# Patient Record
Sex: FEMALE | Race: WHITE | NOT HISPANIC OR LATINO | Employment: FULL TIME | URBAN - METROPOLITAN AREA
[De-identification: names, ages, dates, MRNs, and addresses within clinical notes are randomized per-mention and may not be internally consistent; named-entity substitution may affect disease eponyms.]

---

## 2023-05-20 ENCOUNTER — HOSPITAL ENCOUNTER (EMERGENCY)
Facility: CLINIC | Age: 53
Discharge: HOME | End: 2023-05-20
Attending: EMERGENCY MEDICINE | Admitting: EMERGENCY MEDICINE
Payer: COMMERCIAL

## 2023-05-20 ENCOUNTER — APPOINTMENT (OUTPATIENT)
Dept: GENERAL RADIOLOGY | Facility: CLINIC | Age: 53
End: 2023-05-20
Attending: EMERGENCY MEDICINE
Payer: COMMERCIAL

## 2023-05-20 VITALS
OXYGEN SATURATION: 98 % | HEART RATE: 75 BPM | RESPIRATION RATE: 16 BRPM | DIASTOLIC BLOOD PRESSURE: 89 MMHG | WEIGHT: 210 LBS | SYSTOLIC BLOOD PRESSURE: 167 MMHG | TEMPERATURE: 98.4 F

## 2023-05-20 DIAGNOSIS — M23.91 INTERNAL DERANGEMENT OF KNEE, RIGHT: ICD-10-CM

## 2023-05-20 PROCEDURE — 29505 APPLICATION LONG LEG SPLINT: CPT | Performed by: EMERGENCY MEDICINE

## 2023-05-20 PROCEDURE — 73562 X-RAY EXAM OF KNEE 3: CPT | Mod: RT

## 2023-05-20 PROCEDURE — 99283 EMERGENCY DEPT VISIT LOW MDM: CPT | Performed by: EMERGENCY MEDICINE

## 2023-05-20 PROCEDURE — 99284 EMERGENCY DEPT VISIT MOD MDM: CPT | Mod: 25 | Performed by: EMERGENCY MEDICINE

## 2023-05-20 PROCEDURE — 250N000013 HC RX MED GY IP 250 OP 250 PS 637: Performed by: EMERGENCY MEDICINE

## 2023-05-20 RX ORDER — IBUPROFEN 800 MG/1
800 TABLET, FILM COATED ORAL ONCE
Status: COMPLETED | OUTPATIENT
Start: 2023-05-20 | End: 2023-05-20

## 2023-05-20 RX ADMIN — IBUPROFEN 800 MG: 800 TABLET, FILM COATED ORAL at 20:55

## 2023-05-20 ASSESSMENT — ACTIVITIES OF DAILY LIVING (ADL): ADLS_ACUITY_SCORE: 35

## 2023-05-21 NOTE — ED PROVIDER NOTES
History     Chief Complaint   Patient presents with     Knee Pain     HPI  Anita Nogueira is a 52 year old female who presents to the emergency department secondary to right knee pain.  No known injury.  She was just standing and felt a pop.  Her  was planting in the fields and she was helping him and he asked her to get the bobcat.  She was walking from the truck to the bobcat when she arrived there she stopped and felt a pop in her right knee.  She gadoxetate exactly where it was.  It just hurts in the knee somewhere.  There is no deformity.  She had trouble with bending the knee after that and bearing weight.  The pain is moderate.  She tried Tylenol without relief.  There is no swelling of the knee.  She has not felt any clicking.  She has been having trouble with this knee for quite some time and last week was in to see orthopedics and had an x-ray and was told she has arthritis.  She has not had an MRI.  No previous surgery on that knee.    She saw orthopedics in [place] last week  Xray showed medial compartment narrowing due to arthritis  A/p per below:  Recommendations / Plan  1. Physical therapy  2. Hold on cortisone injection, off  brace  3. Continue with over-the-counter medicines, ice, heat, creams for pain control  4. Follow-up in 3-month      Allergies:  Allergies   Allergen Reactions     No Known Drug Allergy        Problem List:    Patient Active Problem List    Diagnosis Date Noted     HTN, goal below 140/90 03/22/2012     Priority: Medium     Lateral epicondylitis 01/27/2012     Priority: Medium     Hyperlipidemia with target LDL less than 130 06/18/2010     Priority: Medium     Diagnosis updated by automated process. Provider to review and confirm.       Menometrorrhagia 05/14/2010     Priority: Medium        Past Medical History:    No past medical history on file.    Past Surgical History:    Past Surgical History:   Procedure Laterality Date     [facility] LIGATE FALLOPIAN TUBE          Family History:    Family History   Problem Relation Age of Onset     Diabetes Mother      Hypertension Mother      Hypertension Father      Hypertension Brother        Social History:  Marital Status:   [2]  Social History     Tobacco Use     Smoking status: Never   Substance Use Topics     Alcohol use: No     Comment: seldom     Drug use: No        Medications:    cyclobenzaprine (FLEXERIL) 10 MG tablet  ibuprofen (ADVIL,MOTRIN) 800 MG tablet          Review of Systems   All other systems reviewed and are negative.      Physical Exam   BP: (!) 167/89  Pulse: 75  Temp: 98.4  F (36.9  C)  Resp: 16  Weight: 95.3 kg (210 lb)  SpO2: 98 %      Physical Exam  Constitutional:       General: She is not in acute distress.     Appearance: Normal appearance. She is well-developed.   HENT:      Head: Normocephalic and atraumatic.   Eyes:      General: No scleral icterus.     Conjunctiva/sclera: Conjunctivae normal.   Cardiovascular:      Rate and Rhythm: Normal rate.   Pulmonary:      Effort: Pulmonary effort is normal. No respiratory distress.   Abdominal:      General: Abdomen is flat.   Musculoskeletal:         General: No swelling, tenderness, deformity or signs of injury. Normal range of motion.      Cervical back: Normal range of motion and neck supple.      Right lower leg: No edema.      Left lower leg: No edema.      Comments: She has full range of motion of the right knee.  There is no edema or erythema.  There is no deformity.  Medial, lateral, posterior joint line without tenderness.  There is no tenderness over the patellar tendon, quadriceps tendon, patella.   Skin:     General: Skin is warm and dry.      Findings: No rash.   Neurological:      General: No focal deficit present.      Mental Status: She is alert and oriented to person, place, and time.   Psychiatric:         Mood and Affect: Mood normal.         ED Course                 Procedures                  Results for orders placed or  performed during the hospital encounter of 05/20/23 (from the past 24 hour(s))   XR Knee Right 3 Views    Narrative    EXAM: XR KNEE RIGHT 3 VIEWS  LOCATION: [facility]  DATE/TIME: 5/20/2023 8:51 PM CDT    INDICATION: Right knee pain  COMPARISON: None.      Impression    IMPRESSION: The right knee is negative for fracture or compartmental narrowing. No effusion.       Medications   ibuprofen (ADVIL/MOTRIN) tablet 800 mg (800 mg Oral $Given 5/20/23 2055)       Assessments & Plan (with Medical Decision Making)  52-year-old female with right knee pain.  She has had some chronic right knee pain and saw orthopedics recently for this.  They offered injection and physical therapy amongst other possibilities.  She declined the bracing and injection but plan to go to physical therapy.  Unfortunately today she felt a pop in her knee and medial knee pain is much worse.  I would suspect this is related to her meniscus.  She has an orthopedist in [place].  She was fitted for crutches, knee immobilizer was placed.  X-ray was reviewed and there were no acute findings.  There is no significant narrowing of the joint..  Patient was given 80 mg of ibuprofen p.o. here.  I advised, rest, ice, ibuprofen, elevation, immobilization, orthopedic follow-up.  Patient understands and agrees.  She denies the desire for any home pain medications.  All questions answered prior to discharge.     I have reviewed the nursing notes.    I have reviewed the findings, diagnosis, plan and need for follow up with the patient.          Medical Decision Making  The patient's presentation was of low complexity (an acute and uncomplicated illness or injury).    The patient's evaluation involved:  Review of orthpedic visit from last week.   ordering and/or review of 1 test(s) in this encounter (see separate area of note for details)    The patient's management necessitated low risk treatment         Discharge Medication List  as of 5/20/2023  9:07 PM          Final diagnoses:   Internal derangement of knee, right       5/20/2023   [facility] EMERGENCY DEPT     Chay Arriaga MD  05/20/23 [id number]

## 2023-05-21 NOTE — DISCHARGE INSTRUCTIONS
Follow-up with the orthopedic department at [facility].  Your x-ray did not show any fractures or displacement of the bone.  They likely will recommend a cortisone injection.  I recommend rest, ice, ibuprofen, elevation, immobilization, weightbearing as tolerated.  Convert from a knee immobilizer to a hinged type knee brace as weightbearing improves.  It was a pleasure to see you today